# Patient Record
Sex: MALE | Race: WHITE | Employment: FULL TIME | URBAN - METROPOLITAN AREA
[De-identification: names, ages, dates, MRNs, and addresses within clinical notes are randomized per-mention and may not be internally consistent; named-entity substitution may affect disease eponyms.]

---

## 2019-09-11 ENCOUNTER — OFFICE VISIT (OUTPATIENT)
Dept: FAMILY MEDICINE CLINIC | Facility: CLINIC | Age: 64
End: 2019-09-11
Payer: COMMERCIAL

## 2019-09-11 VITALS
BODY MASS INDEX: 31.99 KG/M2 | OXYGEN SATURATION: 98 % | HEART RATE: 74 BPM | TEMPERATURE: 99 F | SYSTOLIC BLOOD PRESSURE: 108 MMHG | DIASTOLIC BLOOD PRESSURE: 80 MMHG | RESPIRATION RATE: 16 BRPM | WEIGHT: 216 LBS | HEIGHT: 69 IN

## 2019-09-11 DIAGNOSIS — Z12.11 SCREENING FOR COLON CANCER: ICD-10-CM

## 2019-09-11 DIAGNOSIS — Z00.00 HEALTHCARE MAINTENANCE: Primary | ICD-10-CM

## 2019-09-11 DIAGNOSIS — H43.811 POSTERIOR VITREOUS DETACHMENT OF RIGHT EYE: ICD-10-CM

## 2019-09-11 DIAGNOSIS — L82.1 SEBORRHEIC KERATOSIS: ICD-10-CM

## 2019-09-11 DIAGNOSIS — L91.8 CUTANEOUS SKIN TAGS: ICD-10-CM

## 2019-09-11 PROCEDURE — 90670 PCV13 VACCINE IM: CPT | Performed by: FAMILY MEDICINE

## 2019-09-11 PROCEDURE — 99397 PER PM REEVAL EST PAT 65+ YR: CPT | Performed by: FAMILY MEDICINE

## 2019-09-11 PROCEDURE — 90471 IMMUNIZATION ADMIN: CPT | Performed by: FAMILY MEDICINE

## 2019-09-11 PROCEDURE — 90715 TDAP VACCINE 7 YRS/> IM: CPT | Performed by: FAMILY MEDICINE

## 2019-09-11 PROCEDURE — 90472 IMMUNIZATION ADMIN EACH ADD: CPT | Performed by: FAMILY MEDICINE

## 2019-09-11 NOTE — PROGRESS NOTES
[facility] [place]  PROGRESS NOTE  Chief Complaint:   Patient presents with:  Physical      HPI:   This is a 71year old male coming in for his annual wellness visit. He has several concerns today that he would like to address.   First, he has at rest.  RESPIRATORY:  Denies shortness of breath, wheezing, cough or sputum. GASTROINTESTINAL:  Denies abdominal pain, nausea, vomiting, constipation, diarrhea, or blood in stool.   MUSCULOSKELETAL:  Denies weakness, muscle aches, back pain, joint pain, axilla. It is not ulcerated or draining at all. HEART:  Regular rate and rhythm, no murmurs, rubs or gallops. LUNGS: Clear to auscultation bilterally, no rales/rhonchi/wheezing.   ABDOMEN:  Soft, nondistended, nontender, bowel sounds normal in all 4 quad may return for cryotherapy. 5. Seborrheic keratosis  He has a relatively large seborrheic keratosis on his left flank area. Plan: No treatment recommended now. He certainly may speak with Dr. Antonella Kinney about the possibility for surgical excision.       Melani Smith

## 2019-09-13 ENCOUNTER — LABORATORY ENCOUNTER (OUTPATIENT)
Dept: LAB | Age: 64
End: 2019-09-13
Attending: FAMILY MEDICINE
Payer: COMMERCIAL

## 2019-09-13 DIAGNOSIS — Z00.00 HEALTHCARE MAINTENANCE: ICD-10-CM

## 2019-09-13 LAB
ALBUMIN SERPL-MCNC: 3.9 G/DL (ref 3.4–5)
ALBUMIN/GLOB SERPL: 1.1 {RATIO} (ref 1–2)
ALP LIVER SERPL-CCNC: 66 U/L (ref 45–117)
ALT SERPL-CCNC: 30 U/L (ref 16–61)
ANION GAP SERPL CALC-SCNC: 4 MMOL/L (ref 0–18)
AST SERPL-CCNC: 23 U/L (ref 15–37)
BASOPHILS # BLD AUTO: 0.06 X10(3) UL (ref 0–0.2)
BASOPHILS NFR BLD AUTO: 0.7 %
BILIRUB SERPL-MCNC: 0.9 MG/DL (ref 0.1–2)
BUN BLD-MCNC: 16 MG/DL (ref 7–18)
BUN/CREAT SERPL: 16 (ref 10–20)
CALCIUM BLD-MCNC: 9.2 MG/DL (ref 8.5–10.1)
CHLORIDE SERPL-SCNC: 106 MMOL/L (ref 98–112)
CHOLEST SMN-MCNC: 220 MG/DL (ref ?–200)
CO2 SERPL-SCNC: 27 MMOL/L (ref 21–32)
COMPLEXED PSA SERPL-MCNC: 0.65 NG/ML (ref ?–4)
CREAT BLD-MCNC: 1 MG/DL (ref 0.7–1.3)
DEPRECATED RDW RBC AUTO: 43.5 FL (ref 35.1–46.3)
EOSINOPHIL # BLD AUTO: 0.09 X10(3) UL (ref 0–0.7)
EOSINOPHIL NFR BLD AUTO: 1 %
ERYTHROCYTE [DISTWIDTH] IN BLOOD BY AUTOMATED COUNT: 12.2 % (ref 11–15)
GLOBULIN PLAS-MCNC: 3.5 G/DL (ref 2.8–4.4)
GLUCOSE BLD-MCNC: 84 MG/DL (ref 70–99)
HCT VFR BLD AUTO: 43.4 % (ref 39–53)
HDLC SERPL-MCNC: 34 MG/DL (ref 40–59)
HGB BLD-MCNC: 14.8 G/DL (ref 13–17.5)
IMM GRANULOCYTES # BLD AUTO: 0.02 X10(3) UL (ref 0–1)
IMM GRANULOCYTES NFR BLD: 0.2 %
LDLC SERPL CALC-MCNC: 157 MG/DL (ref ?–100)
LYMPHOCYTES # BLD AUTO: 1.85 X10(3) UL (ref 1–4)
LYMPHOCYTES NFR BLD AUTO: 20.7 %
M PROTEIN MFR SERPL ELPH: 7.4 G/DL (ref 6.4–8.2)
MCH RBC QN AUTO: 32.9 PG (ref 26–34)
MCHC RBC AUTO-ENTMCNC: 34.1 G/DL (ref 31–37)
MCV RBC AUTO: 96.4 FL (ref 80–100)
MONOCYTES # BLD AUTO: 0.73 X10(3) UL (ref 0.1–1)
MONOCYTES NFR BLD AUTO: 8.2 %
NEUTROPHILS # BLD AUTO: 6.19 X10 (3) UL (ref 1.5–7.7)
NEUTROPHILS # BLD AUTO: 6.19 X10(3) UL (ref 1.5–7.7)
NEUTROPHILS NFR BLD AUTO: 69.2 %
NONHDLC SERPL-MCNC: 186 MG/DL (ref ?–130)
OSMOLALITY SERPL CALC.SUM OF ELEC: 284 MOSM/KG (ref 275–295)
PLATELET # BLD AUTO: 230 10(3)UL (ref 150–450)
POTASSIUM SERPL-SCNC: 4.2 MMOL/L (ref 3.5–5.1)
RBC # BLD AUTO: 4.5 X10(6)UL (ref 3.8–5.8)
SODIUM SERPL-SCNC: 137 MMOL/L (ref 136–145)
TRIGL SERPL-MCNC: 144 MG/DL (ref 30–149)
TSI SER-ACNC: 3.68 MIU/ML (ref 0.36–3.74)
URATE SERPL-MCNC: 5.5 MG/DL (ref 3.5–7.2)
VLDLC SERPL CALC-MCNC: 29 MG/DL (ref 0–30)
WBC # BLD AUTO: 8.9 X10(3) UL (ref 4–11)

## 2019-09-13 PROCEDURE — 80050 GENERAL HEALTH PANEL: CPT | Performed by: FAMILY MEDICINE

## 2019-09-13 PROCEDURE — 84153 ASSAY OF PSA TOTAL: CPT | Performed by: FAMILY MEDICINE

## 2019-09-13 PROCEDURE — 84550 ASSAY OF BLOOD/URIC ACID: CPT | Performed by: FAMILY MEDICINE

## 2019-09-13 PROCEDURE — 36415 COLL VENOUS BLD VENIPUNCTURE: CPT | Performed by: FAMILY MEDICINE

## 2019-09-13 PROCEDURE — 80061 LIPID PANEL: CPT | Performed by: FAMILY MEDICINE

## 2019-09-14 ENCOUNTER — TELEPHONE (OUTPATIENT)
Dept: FAMILY MEDICINE CLINIC | Facility: CLINIC | Age: 64
End: 2019-09-14

## 2019-09-14 NOTE — TELEPHONE ENCOUNTER
Patient informed of below. Expressed understanding. Copy of labs mailed to patient along with  Copy of Low Cholesterol Pamphlet.   Renetta Magaña, 09/14/19, 10:16 AM

## 2019-09-14 NOTE — TELEPHONE ENCOUNTER
----- Message from Geraldine Lopes MD sent at 9/13/2019  5:04 PM CDT -----  Please call Sahil Bullock. His hemoglobin is normal at 14.8. He is not anemic. His blood sugar is normal.  His kidney function is normal.  His cholesterol is elevated at 220.   Normal is

## 2021-03-15 DIAGNOSIS — Z23 NEED FOR VACCINATION: ICD-10-CM

## 2021-10-27 ENCOUNTER — TELEPHONE (OUTPATIENT)
Dept: FAMILY MEDICINE CLINIC | Facility: CLINIC | Age: 66
End: 2021-10-27

## 2021-10-27 NOTE — TELEPHONE ENCOUNTER
As below. Patient states the area when he had the injection no loner hurts. States the pain is now at the curve of his L shoulder. Denies having any shooting pains but feels the pain mostly when raising his arm or reaching for something.   Patient denies

## 2021-10-27 NOTE — TELEPHONE ENCOUNTER
Re:  got the 2nd Monderna shot 10/22 -  then Sat, Sun & Monday was sore - but now pain is going up into the shoulder. - is this normal - worried about blood clots - Please advise

## 2021-10-27 NOTE — TELEPHONE ENCOUNTER
Can try ice four times a day for 20 minutes each and ibuprofen 800mg every 8 hours with food for the next few days, if no improvement or gets worse in the meantime schedule an appointment; can get inflammation of the shoulder area after vaccinations.   If o

## 2022-09-15 NOTE — PATIENT INSTRUCTIONS
Fasting labs today  Get fitness tracker/pedometer to track your activity, aim for eventual 10,000 steps a day.   DASH/Mediterranean diet changes  Look into [employer] or MyFitness pal for dietary tracking, daily weights first thing in the morning on empty bladder and without clothing (record and trend)  Check on Shingrix (shingles) vaccination, two dose series; can get through your pharmacy or here in the office  Dermatology consult for multiple moles, Dr. Ludivina Kelley; ensure she's in your insurance plan  Await lab results

## 2022-09-16 NOTE — TELEPHONE ENCOUNTER
----- Message from ERROL Staples sent at 9/16/2022  7:35 AM CDT -----  SaySwapt message sent. CBC stable. Uric acid stable. Metabolic panel without signs of diabetes, stable otherwise. Normal PSA level. TSH is mildly elevated though normal free t4, this can indicate the thyroid is heading toward becoming hypoactive though is not high enough at this point to treat with medication; aim for weight loss and dietary changes as reviewed yesterday which can help. Unfortunately cholesterol levels are very high, more so than 3 years ago. In addition to dietary modifications and weight loss, recommend starting a once a day statin medication and checking again in 3 months; prescription sent to the pharmacy.

## 2022-12-21 NOTE — TELEPHONE ENCOUNTER
Patient requesting a RF of her Atorvastatin to [facility]; pended. Patient also states he has felt \"off\" for about 3-4 days now. States he has been feeling fatigue. States every once in awhile he feels something in the back of his throat that makes him want to cough and he feels a tiny bit stuffy. States he was talking to someone on the phone today and they told him he sounded a bit \"nasally\" and patient agreed. Patient denies fever. Concerned about possible sinus infection and need for antibiotics. Declined in person visit stating \"I don't think she will be able to really see anything. \"  Patient did opt for a virtual visit to discuss symptoms. Appt scheduled. Future Appointments   Date Time Provider Keyla Ortega   12/21/2022 12:30 PM ERROL Roper [facility]  verbally consents a Virtual/Telephone Check-In service on 12/21/2022. Patient understands and accepts financial responsibility for any deductible, co-insurance and/or co-pays associated with this service.

## 2022-12-21 NOTE — PATIENT INSTRUCTIONS
Viral swab through [address] through  Rest, fluids, steam, humidifier  Nasal saline if needed, okay for flonase  Review typical duration of viral illness and treatments, await viral swab

## 2022-12-21 NOTE — TELEPHONE ENCOUNTER
Okay for refill of statin, I did a 3 month as a reminder to ensure getting recheck of lipid panel. Should get fasting lipid panel next week, pending exam today.

## 2022-12-22 NOTE — TELEPHONE ENCOUNTER
Instead of DayQuil or NyQuil which has a decongestant in it, I would recommend Coricidin HBP or Mucinex DM.

## 2022-12-22 NOTE — TELEPHONE ENCOUNTER
Unsure what medication he is looking for. Sounds like his symptoms are viral and require symptomatic treatment. Please clarify.

## 2022-12-22 NOTE — TELEPHONE ENCOUNTER
Fax received from [facility]. Covid, Flu A/B, and RSV done yesterday, 12/21/2022 all came back Negative. Please advise.

## 2022-12-22 NOTE — TELEPHONE ENCOUNTER
See OV notes from yesterday, 12/21/2022 and phone note with results from today, 12/22/2022. Please advise.

## 2022-12-22 NOTE — TELEPHONE ENCOUNTER
Patient informed of the negative covid, flu A/B, and RSV done at [facility] yesterday. Patient states since his appt yesterday, he is feeling much worse. States there is a lot of PND causing a cough and tickle in his throat. States he has a runny nose, sinus drainage, and sneezing at times. Patient states he is not taking anything otc for his symptoms right now but is going to  Flonase as recommended by [facility]. States he does take Vitamin d3, zinc, vitamin c, and fish oil on a daily basis. Wondering about Dayquil/Nyquil? Please advise.

## 2023-01-19 NOTE — TELEPHONE ENCOUNTER
Patient states beginning this week, he started with a headache, pnd, and cough. States mid week he was having chills. States he has been sleeping all week. Concerned about a sinus infection. Appt scheduled with respiratory clinic this afternoon.      Future Appointments   Date Time Provider Keyla Shannon   1/19/2023  1:30 PM Deshawn Romero APRN [facility] [facility] [place]

## 2023-01-19 NOTE — PATIENT INSTRUCTIONS
Covid test pending. Quarantine until results. Directed to take antibiotics until gone. Recommend to treat symptoms as needed. Continue with the [facility]  Return to clinic if not better   Otherwise follow-up as needed.

## 2023-03-20 NOTE — TELEPHONE ENCOUNTER
Future appt:     Last Appointment with provider:  9/15/2022; Return in about 1 year (around 9/15/2023) for Physical .    Last appointment at [facility]:  1/19/2023  Cholesterol, Total (mg/dL)   Date Value   09/15/2022 244 (H)     HDL Cholesterol (mg/dL)   Date Value   09/15/2022 34 (L)     LDL Cholesterol (mg/dL)   Date Value   09/15/2022 170 (H)     Triglycerides (mg/dL)   Date Value   09/15/2022 215 (H)     No results found for: EAG, A1C  Lab Results   Component Value Date    T4F 0.8 09/15/2022    TSH 4.670 (H) 09/15/2022     Last RF:  12/21/2022    No follow-ups on file.

## 2023-03-20 NOTE — TELEPHONE ENCOUNTER
Please remind patient to get a fasting lipid panel rechecked to ensure medication helping his cholesterol well.

## 2023-03-24 NOTE — TELEPHONE ENCOUNTER
Pt states upper jaw pain above his right molars. Pt has had this problem in the past and it was previously classified as a sinus infection. Currently pt is having trouble biting down on right side due to pain and pressure. Pt states increased pressure has started the last day or two. Pt states that he has taken antibiotic in the past and it has cleared temporarily. Pt has not taken anything over the counter. Informed pt to try tylenol for pain. He can also alternate with advil for more intense pain. Also recommended saline sinus rinses, and antihistamine for symptom relief. No available appointments for today. Pt preferred to see ERROL Becker for eval on Monday. Pt needed afternoon appointment due to work meetings. Will plan for appointment.     Future Appointments   Date Time Provider Keyla Shannon   3/27/2023  3:30 PM ERROL Scherer [facility] [place] [facility]

## 2023-03-24 NOTE — TELEPHONE ENCOUNTER
Pt states that he has a sinus infection and it seems to be affecting one of his molars. States that this has happened before and it just feels like he has a toothache. Would like a call back.

## 2023-03-27 NOTE — PATIENT INSTRUCTIONS
Augmentin 1 tablet twice day for ten days; take with food    Start Zyrtec 10mg nightly  Start fluticasone (flonase) two sprays each nostril once a day for a week then one spray each nostril daily  Use daily for the next 4-6 weeks to see if helps additionally  If no improvement consider CT of sinuses

## (undated) NOTE — LETTER
Date: 1/19/2023    Patient Name: Mamie Lopez          To Whom it may concern: This letter has been written at the patient's request. The above patient was seen at the [facility] for treatment of a medical condition. This patient should be excused from attending work  from 1/17 through 1/20. The patient may return to work on 1/23 if without a fever for at least 24 hours without treatment.          Sincerely,      ERROL Palomares